# Patient Record
Sex: MALE | Race: OTHER | HISPANIC OR LATINO | URBAN - METROPOLITAN AREA
[De-identification: names, ages, dates, MRNs, and addresses within clinical notes are randomized per-mention and may not be internally consistent; named-entity substitution may affect disease eponyms.]

---

## 2018-05-17 ENCOUNTER — EMERGENCY (EMERGENCY)
Facility: HOSPITAL | Age: 33
LOS: 0 days | Discharge: HOME | End: 2018-05-18
Attending: EMERGENCY MEDICINE | Admitting: EMERGENCY MEDICINE

## 2018-05-17 VITALS
TEMPERATURE: 97 F | HEART RATE: 98 BPM | OXYGEN SATURATION: 99 % | RESPIRATION RATE: 18 BRPM | SYSTOLIC BLOOD PRESSURE: 138 MMHG | WEIGHT: 220.02 LBS | DIASTOLIC BLOOD PRESSURE: 91 MMHG

## 2018-05-17 VITALS
RESPIRATION RATE: 18 BRPM | SYSTOLIC BLOOD PRESSURE: 114 MMHG | TEMPERATURE: 97 F | OXYGEN SATURATION: 98 % | DIASTOLIC BLOOD PRESSURE: 73 MMHG | HEART RATE: 102 BPM

## 2018-05-17 DIAGNOSIS — R45.1 RESTLESSNESS AND AGITATION: ICD-10-CM

## 2018-05-17 DIAGNOSIS — R06.6 HICCOUGH: ICD-10-CM

## 2018-05-17 DIAGNOSIS — F10.129 ALCOHOL ABUSE WITH INTOXICATION, UNSPECIFIED: ICD-10-CM

## 2018-05-17 RX ORDER — HALOPERIDOL DECANOATE 100 MG/ML
5 INJECTION INTRAMUSCULAR ONCE
Qty: 0 | Refills: 0 | Status: COMPLETED | OUTPATIENT
Start: 2018-05-17 | End: 2018-05-17

## 2018-05-17 RX ADMIN — HALOPERIDOL DECANOATE 5 MILLIGRAM(S): 100 INJECTION INTRAMUSCULAR at 17:10

## 2018-05-17 RX ADMIN — Medication 2 MILLIGRAM(S): at 17:10

## 2018-05-17 NOTE — ED PROVIDER NOTE - NEUROLOGICAL, MLM
Alert and oriented x 3, no focal deficits, no motor or sensory deficits. unable to assess secondary to intoxication

## 2018-05-17 NOTE — ED PROVIDER NOTE - MEDICAL DECISION MAKING DETAILS
33m w EtOH intoxication observed until clinically sober. Seen by Psych and no indication for inpatient Psych admit at this time. Pt advised regarding symptomatic/supportive care, importance of PMD/Detox f/u, and symptoms to prompt ED return.

## 2018-05-17 NOTE — ED ADULT NURSE REASSESSMENT NOTE - NS ED NURSE REASSESS COMMENT FT1
Pt verbalized suicidal thoughts; attempt for elopement. Pt on direct 1:1 observation; medication given as per MD orders. Pt calm at this time; attempting to get up out of bed occasionally; able to be redirected/ cooperative. Pt used bathroom; eating/drinking.
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
## 2018-05-17 NOTE — ED PROVIDER NOTE - CONSTITUTIONAL, MLM
normal... Awake, alert, oriented to person, place, time/situation, ETOH smelling Awake, alert, oriented to person, ETOH smelling
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
## 2018-05-17 NOTE — ED PROVIDER NOTE - ATTENDING CONTRIBUTION TO CARE
33M no pmh, p/w ETOH intox. admits to daily drinking, states drank Rani 2 hours PTA. denies head injury or any trauma. no hx w/d sz. denies ha, neck pain, cp, sob, abd pain, nvdc, dysuria, freq, cough, numbness, weakness. pt doesn't know how he came to ED or why. denies smoking or drug use. on exam, AFVSS, + AOB, +hiccups, well bharath, nad, ncat, eomi, perrla, mmm, lctab, rrr nl s1s2 no mrg, abd soft ntnd, aaox3, no focal deficits, no le edema or calf ttp, a/p; ETOH intox, will obs for clinical sobriety. no sign of trauma or w/d. no medical complaints.
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
## 2021-08-20 NOTE — ED ADULT NURSE NOTE - HARM RISK FACTORS
yes Moncho Rogers (MD)  Surgery; Surgical Critical Care; Thoracic and Cardiac Surgery  [address] [place] [address]  Phone: [phone]  Fax: [phone]  Follow Up Time: